# Patient Record
Sex: MALE | Race: OTHER | HISPANIC OR LATINO | URBAN - METROPOLITAN AREA
[De-identification: names, ages, dates, MRNs, and addresses within clinical notes are randomized per-mention and may not be internally consistent; named-entity substitution may affect disease eponyms.]

---

## 2019-10-23 ENCOUNTER — APPOINTMENT (EMERGENCY)
Dept: RADIOLOGY | Facility: HOSPITAL | Age: 24
End: 2019-10-23

## 2019-10-23 ENCOUNTER — HOSPITAL ENCOUNTER (EMERGENCY)
Facility: HOSPITAL | Age: 24
Discharge: HOME | End: 2019-10-23
Attending: EMERGENCY MEDICINE | Admitting: EMERGENCY MEDICINE
Payer: OTHER MISCELLANEOUS

## 2019-10-23 VITALS
DIASTOLIC BLOOD PRESSURE: 75 MMHG | TEMPERATURE: 97.6 F | OXYGEN SATURATION: 94 % | BODY MASS INDEX: 31.5 KG/M2 | RESPIRATION RATE: 14 BRPM | SYSTOLIC BLOOD PRESSURE: 174 MMHG | WEIGHT: 220 LBS | HEIGHT: 70 IN | HEART RATE: 85 BPM

## 2019-10-23 DIAGNOSIS — S60.019A THUMB CONTUSION: Primary | ICD-10-CM

## 2019-10-23 DIAGNOSIS — S67.22XA CRUSHING INJURY OF LEFT HAND, INITIAL ENCOUNTER: ICD-10-CM

## 2019-10-23 PROCEDURE — 73130 X-RAY EXAM OF HAND: CPT

## 2019-10-23 PROCEDURE — 96372 THER/PROPH/DIAG INJ SC/IM: CPT

## 2019-10-23 PROCEDURE — 99284 EMERGENCY DEPT VISIT MOD MDM: CPT

## 2019-10-23 PROCEDURE — 90715 TDAP VACCINE 7 YRS/> IM: CPT | Performed by: EMERGENCY MEDICINE

## 2019-10-23 PROCEDURE — 99284 EMERGENCY DEPT VISIT MOD MDM: CPT | Performed by: EMERGENCY MEDICINE

## 2019-10-23 PROCEDURE — 90471 IMMUNIZATION ADMIN: CPT

## 2019-10-23 RX ORDER — KETOROLAC TROMETHAMINE 30 MG/ML
15 INJECTION, SOLUTION INTRAMUSCULAR; INTRAVENOUS ONCE
Status: COMPLETED | OUTPATIENT
Start: 2019-10-23 | End: 2019-10-23

## 2019-10-23 RX ADMIN — KETOROLAC TROMETHAMINE 15 MG: 30 INJECTION, SOLUTION INTRAMUSCULAR at 19:42

## 2019-10-23 RX ADMIN — TETANUS TOXOID, REDUCED DIPHTHERIA TOXOID AND ACELLULAR PERTUSSIS VACCINE, ADSORBED 0.5 ML: 5; 2.5; 8; 8; 2.5 SUSPENSION INTRAMUSCULAR at 19:27

## 2019-10-23 NOTE — ED ATTENDING ATTESTATION
10/23/2019  IHector MD, saw and evaluated the patient  I have discussed the patient with the resident/non-physician practitioner and agree with the resident's/non-physician practitioner's findings, Plan of Care, and MDM as documented in the resident's/non-physician practitioner's note, except where noted  All available labs and Radiology studies were reviewed  I was present for key portions of any procedure(s) performed by the resident/non-physician practitioner and I was immediately available to provide assistance  At this point I agree with the current assessment done in the Emergency Department  I have conducted an independent evaluation of this patient a history and physical is as follows:    ED Course         Critical Care Time  Procedures    25 yo male with injury to left hand after 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

## 2019-10-24 NOTE — DISCHARGE INSTRUCTIONS
Called the provided number to schedule appointment with Orthopedic surgery  You will need to be evaluated in the next week  Return immediately to the emergency department if you develop numbness, worsening pain, coolness of your affected hand  Follow up with occupational medicine this was a workplace injury  Take ibuprofen for pain

## 2019-10-24 NOTE — ED PROCEDURE NOTE
Procedure  Splint application  Date/Time: 10/23/2019 10:32 PM  Performed by: Galileo Gallagher MD  Authorized by: Galileo Gallagher MD     Patient location:  Bedside  Procedure performed by emergency physician: Yes    Other Assisting Provider: Yes (comment)    Consent:     Consent obtained:  Verbal    Consent given by:  Patient    Risks discussed:  Discoloration, numbness, pain and swelling  Universal protocol:     Patient identity confirmed:  Verbally with patient  Indication:     Indications: other medical problem (comment)    Pre-procedure details:     Sensation:  Normal    Skin color:  Pink  Procedure details:     Laterality:  Left    Location:  Hand    Hand:  L hand    Strapping: no      Splint type:  Thumb spica  Post-procedure details:     Pain:  Unchanged    Sensation:  Normal    Neurovascular Exam: skin pink, capillary refill <2 sec, normal pulses and skin intact, warm, and dry      Patient tolerance of procedure:   Tolerated well, no immediate complications                     Galileo Gallagher MD  10/23/19 2232

## 2019-10-24 NOTE — ED PROCEDURE NOTE
Procedure  Laceration repair  Date/Time: 10/23/2019 10:33 PM  Performed by: Vianney Philippe MD  Authorized by: Vianney Philippe MD   Patient identity confirmed: verbally with patient  Body area: upper extremity  Location details: left lower arm  Laceration length: 1 5 cm  Foreign bodies: no foreign bodies  Tendon involvement: none  Nerve involvement: none  Vascular damage: no    Sedation:  Patient sedated: no      Wound Dehiscence:  Superficial Wound Dehiscence: simple closure      Procedure Details:  Irrigation method: tap  Amount of cleaning: standard  Debridement: none  Degree of undermining: none  Skin closure: 4-0 nylon  Number of sutures: 3  Technique: simple  Approximation: close  Approximation difficulty: simple  Dressing: 4x4 sterile gauze                       Vianney Philippe MD  10/23/19 5598

## 2019-10-24 NOTE — ED PROVIDER NOTES
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 piece of metal shell thing  Patient states immediate 10/10 pain throughout the left hand on both the dorsal and palmar aspects  Patient denies any diminished sensation  Patient has a small laceration on the palmar aspect without foreign body  Patient does not know his tetanus status  None       History reviewed  No pertinent past medical history  History reviewed  No pertinent surgical history  History reviewed  No pertinent family history  I have reviewed and agree with the history as documented  Social History     Tobacco Use    Smoking status: Never Smoker    Smokeless tobacco: Never Used   Substance Use Topics    Alcohol use: Yes    Drug use: Not Currently        Review of Systems   Constitutional: Negative for chills and fever  HENT: Negative for sore throat  Eyes: Negative for photophobia and visual disturbance  Respiratory: Negative for cough, chest tightness, shortness of breath and wheezing  Cardiovascular: Negative for chest pain, palpitations and leg swelling  Gastrointestinal: Negative for abdominal distention, abdominal pain, constipation, diarrhea, nausea and vomiting  Genitourinary: Negative for difficulty urinating, dysuria, flank pain and hematuria  Musculoskeletal: Positive for arthralgias (Left hand) and myalgias (Left hand)  Negative for gait problem and joint swelling  Skin: Negative for color change, pallor and rash  Neurological: Negative for syncope, weakness, numbness and headaches  Psychiatric/Behavioral: Negative for confusion  Physical Exam  ED Triage Vitals [10/23/19 1857]   Temperature Pulse Respirations Blood Pressure SpO2   97 6 °F (36 4 °C) 99 22 136/83 97 %      Temp src Heart Rate Source Patient Position - Orthostatic VS BP Location FiO2 (%)   -- Monitor Sitting Right arm --      Pain Score       Worst Possible Pain             Orthostatic Vital Signs  Vitals:    10/23/19 1857 10/23/19 1919 10/23/19 2113   BP: 136/83  (!) 174/75   Pulse: 99 92 85   Patient Position - Orthostatic VS: Sitting  Sitting       Physical Exam   Constitutional: He is oriented to person, place, and time  He appears well-developed and well-nourished  No distress  HENT:   Head: Normocephalic and atraumatic  Right Ear: External ear normal    Left Ear: External ear normal    Nose: Nose normal    Mouth/Throat: Oropharynx is clear and moist  No oropharyngeal exudate  Eyes: Pupils are equal, round, and reactive to light  Conjunctivae are normal    Cardiovascular: Normal rate, regular rhythm, normal heart sounds and intact distal pulses  Exam reveals no gallop and no friction rub  No murmur heard  Pulmonary/Chest: Effort normal and breath sounds normal  No respiratory distress  He has no wheezes  He exhibits no tenderness  Abdominal: Soft  Bowel sounds are normal  He exhibits no distension and no mass  There is no tenderness  There is no rebound and no guarding  Musculoskeletal: He exhibits no edema or deformity  Significant tenderness of the dorsal aspect of left hand, significant swelling of the thenar eminence with fullness but no tenseness  All digits neurovascularly intact  Movements limited by pain  Patient does have tenderness in the anatomic snuffbox but this is no greater than his tenderness anywhere else in his hand    1 5 cm laceration overlying dorsal aspect of distal radial forearm without foreign body, no tendinous involvement   Neurological: He is alert and oriented to person, place, and time  Skin: Skin is warm and dry  Capillary refill takes less than 2 seconds  He is not diaphoretic  Psychiatric: He has a normal mood and affect  His behavior is normal    Nursing note and vitals reviewed  ED Medications  Medications   tetanus-diphtheria-acellular pertussis (BOOSTRIX) IM injection 0 5 mL (0 5 mL Intramuscular Given 10/23/19 1927)   ketorolac (TORADOL) injection 15 mg (15 mg Intramuscular Given 10/23/19 1942)       Diagnostic Studies  Results Reviewed     None                 XR hand 3+ views LEFT   Final Result by Carmine Kelly MD (10/23 1957)      Swelling throughout the hand and distal forearm  No acute fracture  Workstation performed: [id number]               Procedures  Procedures        ED Course                               MDM  Number of Diagnoses or Management Options  Crushing injury of left hand, initial encounter:   Thumb contusion:   Diagnosis management comments: Patient presents with a crush injury left hand, hand neurovascularly intact but significant tenderness, swelling of the thenar aspect of his thumb  Hand x-rays without demonstrated fracture  Discussed patient with Orthopedic surgery resident who evaluated the patient's films, agreed with this assessment  Given patient's tenderness in the anatomic snuffbox will place spica cast   Discussed patient is a high risk mechanism for compartment syndrome, discussed the signs and symptoms of compartment syndrome and provided patient with information on the condition, patient given strict return precautions, provided with information to follow up with Orthopedic surgery in 1 week, will go to occupational medicine tomorrow         Amount and/or Complexity of Data Reviewed  Tests in the radiology section of CPT®: ordered and reviewed  Decide to obtain previous medical records or to obtain history from someone other than the patient: yes  Review and summarize past medical records: yes  Discuss the patient with other providers: yes  Independent visualization of images, tracings, or specimens: yes    Risk of Complications, Morbidity, and/or Mortality  Presenting problems: moderate  Diagnostic procedures: minimal  Management options: minimal    Patient Progress  Patient progress: improved      Disposition  Final diagnoses:   Thumb contusion   Crushing injury of left hand, initial encounter     Time reflects when diagnosis was documented in both MDM as applicable and the Disposition within this note     Time User Action Codes Description Comment    10/23/2019 10:11 PM Jasmeet Stover Add [S60 019A] Thumb contusion     10/23/2019 10:11 PM Jasmeet Stover Add [[id number]] Crushing injury of left hand, initial encounter       ED Disposition     ED Disposition Condition Date/Time Comment    Discharge Stable Wed Oct 23, 2019  8:43 PM [address] [place] [address] discharge to home/self care  Follow-up Information     Follow up With Specialties Details Why Contact Info Additional [facility]   [address] [id number]  [address] [place] [address] [address], [address]  [phone]           There are no discharge medications for this patient  No discharge procedures on file  ED Provider  Attending physically available and evaluated [facility]  I managed the patient along with the ED Attending      Electronically Signed by         Neha Bullock MD  10/23/19 7316